# Patient Record
Sex: MALE | Race: WHITE | NOT HISPANIC OR LATINO | Employment: FULL TIME | URBAN - METROPOLITAN AREA
[De-identification: names, ages, dates, MRNs, and addresses within clinical notes are randomized per-mention and may not be internally consistent; named-entity substitution may affect disease eponyms.]

---

## 2022-05-09 ENCOUNTER — OFFICE VISIT (OUTPATIENT)
Dept: FAMILY MEDICINE CLINIC | Facility: CLINIC | Age: 22
End: 2022-05-09
Payer: COMMERCIAL

## 2022-05-09 ENCOUNTER — APPOINTMENT (OUTPATIENT)
Dept: LAB | Facility: CLINIC | Age: 22
End: 2022-05-09
Payer: COMMERCIAL

## 2022-05-09 VITALS
DIASTOLIC BLOOD PRESSURE: 82 MMHG | HEART RATE: 97 BPM | SYSTOLIC BLOOD PRESSURE: 132 MMHG | RESPIRATION RATE: 16 BRPM | WEIGHT: 177 LBS | TEMPERATURE: 97 F | BODY MASS INDEX: 26.22 KG/M2 | OXYGEN SATURATION: 97 % | HEIGHT: 69 IN

## 2022-05-09 DIAGNOSIS — Z20.828 EXPOSURE TO HERPES SIMPLEX VIRUS (HSV): ICD-10-CM

## 2022-05-09 DIAGNOSIS — Z00.00 ENCOUNTER FOR ANNUAL PHYSICAL EXAM: Primary | ICD-10-CM

## 2022-05-09 DIAGNOSIS — F17.210 CIGARETTE NICOTINE DEPENDENCE WITHOUT COMPLICATION: ICD-10-CM

## 2022-05-09 LAB
HBV SURFACE AG SER QL: NORMAL
HCV AB SER QL: NORMAL

## 2022-05-09 PROCEDURE — 36415 COLL VENOUS BLD VENIPUNCTURE: CPT

## 2022-05-09 PROCEDURE — 86592 SYPHILIS TEST NON-TREP QUAL: CPT

## 2022-05-09 PROCEDURE — 99385 PREV VISIT NEW AGE 18-39: CPT | Performed by: FAMILY MEDICINE

## 2022-05-09 PROCEDURE — 3725F SCREEN DEPRESSION PERFORMED: CPT | Performed by: FAMILY MEDICINE

## 2022-05-09 PROCEDURE — 86696 HERPES SIMPLEX TYPE 2 TEST: CPT

## 2022-05-09 PROCEDURE — 87491 CHLMYD TRACH DNA AMP PROBE: CPT

## 2022-05-09 PROCEDURE — 86803 HEPATITIS C AB TEST: CPT

## 2022-05-09 PROCEDURE — 86695 HERPES SIMPLEX TYPE 1 TEST: CPT

## 2022-05-09 PROCEDURE — 87340 HEPATITIS B SURFACE AG IA: CPT

## 2022-05-09 PROCEDURE — 87389 HIV-1 AG W/HIV-1&-2 AB AG IA: CPT

## 2022-05-09 PROCEDURE — 87591 N.GONORRHOEAE DNA AMP PROB: CPT

## 2022-05-09 NOTE — PROGRESS NOTES
BMI Counseling: Body mass index is 26 14 kg/m²  The BMI is above normal  Nutrition recommendations include decreasing portion sizes, encouraging healthy choices of fruits and vegetables, consuming healthier snacks and moderation in carbohydrate intake  Exercise recommendations include exercising 3-5 times per week  No pharmacotherapy was ordered  Rationale for BMI follow-up plan is due to patient being overweight or obese  Depression Screening and Follow-up Plan: Patient was screened for depression during today's encounter  They screened negative with a PHQ-2 score of 0  Assessment/Plan:         Problem List Items Addressed This Visit        Other    Exposure to herpes simplex virus (HSV)     Ex-girlfriend had it and pt wants edelmira checked  No lesions present  Relevant Orders    HSV 1/2 IgM and Type Specific IgG    HIV 1/2 Antigen/Antibody (4th Generation) w Reflex [facility]    RPR    Chlamydia/GC amplified DNA by PCR    Hepatitis B surface antigen    Hepatitis C antibody    Encounter for annual physical exam - Primary     CPE done  Last Tdap was in 2010  Pt declined it today  Recommend COVID vaccines  Pt had Gardasil series  Pt advised to follow a well balanced, heart healthy diet and to exercise on a regular basis  BP good  Cigarette nicotine dependence without complication     Pt smokes a few cigs per day  Discussed quitting and pt to think about it  Subjective:      Patient ID: Erin Haddad is a 20 y o  male  Pt here for new pt physical  Doing well  No cp/sob  No headaches  Pt smokes cigs and MJ  Used to vape  Pt active at work and rides bike  Drinks a few days per week  Pt also was exposed to ex-girlfriend who recently found out that she has HSV  Pt wants to be tested  Pt never had outbreak  No known history of cold sores  Last Tdap was in 2010  Had gardasil series         The following portions of the patient's history were reviewed and updated as appropriate: Past Medical History:  He has a past medical history of Allergies and Asthma ,  _______________________________________________________________________  Medical Problems:  does not have any pertinent problems on file ,  _______________________________________________________________________  Past Surgical History:   has a past surgical history that includes [place] tooth extraction  ,  _______________________________________________________________________  Family History:  family history includes Hyperlipidemia in his father and mother; Hypertension in his father and mother ,  _______________________________________________________________________  Social History:   reports that he has been smoking cigarettes  He has been smoking about 0 25 packs per day  He has never used smokeless tobacco  He reports previous alcohol use  He reports current drug use  Drug: Marijuana  ,  _______________________________________________________________________  Allergies:  has No Known Allergies     _______________________________________________________________________  No current outpatient medications on file  No current facility-administered medications for this visit      _______________________________________________________________________  Review of Systems   Constitutional: Negative for activity change, appetite change, fatigue and unexpected weight change  HENT: Negative for congestion, ear pain, rhinorrhea, sore throat and trouble swallowing  Eyes: Negative for pain, discharge and visual disturbance  Respiratory: Negative for cough, shortness of breath and wheezing  Cardiovascular: Negative for chest pain, palpitations and leg swelling  Gastrointestinal: Negative for abdominal pain, constipation, diarrhea, nausea and vomiting  Endocrine: Negative for polydipsia, polyphagia and polyuria  Genitourinary: Negative for difficulty urinating and hematuria     Musculoskeletal: Negative for arthralgias, back pain, gait problem, myalgias and neck pain  Skin: Negative for color change and rash  Neurological: Negative for dizziness, weakness and headaches  Hematological: Negative for adenopathy  Does not bruise/bleed easily  Psychiatric/Behavioral: Negative for dysphoric mood and sleep disturbance  The patient is not nervous/anxious  Objective:  Vitals:    05/09/22 1302   BP: 132/82   BP Location: Left arm   Patient Position: Sitting   Cuff Size: Standard   Pulse: 97   Resp: 16   Temp: (!) 97 °F (36 1 °C)   TempSrc: Temporal   SpO2: 97%   Weight: 80 3 kg (177 lb)   Height: 5' 9" (1 753 m)     Body mass index is 26 14 kg/m²  Physical Exam  Vitals and nursing note reviewed  Constitutional:       Appearance: Normal appearance  He is well-developed and normal weight  HENT:      Head: Normocephalic and atraumatic  Right Ear: Tympanic membrane, ear canal and external ear normal       Left Ear: Tympanic membrane, ear canal and external ear normal       Nose: Nose normal       Mouth/Throat:      Mouth: Mucous membranes are moist       Pharynx: Oropharynx is clear  No posterior oropharyngeal erythema  Eyes:      Conjunctiva/sclera: Conjunctivae normal       Pupils: Pupils are equal, round, and reactive to light  Neck:      Thyroid: No thyromegaly  Cardiovascular:      Rate and Rhythm: Normal rate and regular rhythm  Heart sounds: Normal heart sounds  No murmur heard  Pulmonary:      Effort: Pulmonary effort is normal       Breath sounds: Normal breath sounds  No wheezing or rales  Abdominal:      General: Bowel sounds are normal  There is no distension  Palpations: Abdomen is soft  There is no mass  Tenderness: There is no abdominal tenderness  Musculoskeletal:         General: No deformity  Normal range of motion  Cervical back: Normal range of motion and neck supple  Right lower leg: No edema  Left lower leg: No edema     Lymphadenopathy:      Cervical: No cervical adenopathy  Skin:     General: Skin is warm and dry  Capillary Refill: Capillary refill takes less than 2 seconds  Findings: No erythema or rash  Neurological:      General: No focal deficit present  Mental Status: He is alert and oriented to person, place, and time  Mental status is at baseline  Cranial Nerves: No cranial nerve deficit  Sensory: No sensory deficit  Motor: No abnormal muscle tone  Coordination: Coordination normal    Psychiatric:         Mood and Affect: Mood normal          Behavior: Behavior normal          Thought Content:  Thought content normal          Judgment: Judgment normal

## 2022-05-09 NOTE — ASSESSMENT & PLAN NOTE
CPE done  Last Tdap was in 2010  Pt declined it today  Recommend COVID vaccines  Pt had Gardasil series  Pt advised to follow a well balanced, heart healthy diet and to exercise on a regular basis  BP good

## 2022-05-10 LAB
C TRACH DNA SPEC QL NAA+PROBE: NEGATIVE
HSV1 IGG SER IA-ACNC: <0.91 INDEX (ref 0–0.9)
HSV2 IGG SER IA-ACNC: <0.91 INDEX (ref 0–0.9)
N GONORRHOEA DNA SPEC QL NAA+PROBE: NEGATIVE
RPR SER QL: NORMAL

## 2022-05-11 LAB — HIV 1+2 AB+HIV1 P24 AG SERPL QL IA: NORMAL

## 2022-05-17 LAB — MISCELLANEOUS LAB TEST RESULT: NORMAL

## 2022-05-19 ENCOUNTER — OFFICE VISIT (OUTPATIENT)
Dept: FAMILY MEDICINE CLINIC | Facility: CLINIC | Age: 22
End: 2022-05-19
Payer: COMMERCIAL

## 2022-05-19 VITALS
HEIGHT: 69 IN | OXYGEN SATURATION: 99 % | SYSTOLIC BLOOD PRESSURE: 122 MMHG | RESPIRATION RATE: 16 BRPM | BODY MASS INDEX: 25.92 KG/M2 | WEIGHT: 175 LBS | HEART RATE: 80 BPM | DIASTOLIC BLOOD PRESSURE: 80 MMHG | TEMPERATURE: 97.6 F

## 2022-05-19 DIAGNOSIS — J06.9 ACUTE URI: Primary | ICD-10-CM

## 2022-05-19 PROCEDURE — 3008F BODY MASS INDEX DOCD: CPT | Performed by: FAMILY MEDICINE

## 2022-05-19 PROCEDURE — 1036F TOBACCO NON-USER: CPT | Performed by: FAMILY MEDICINE

## 2022-05-19 PROCEDURE — 99213 OFFICE O/P EST LOW 20 MIN: CPT | Performed by: FAMILY MEDICINE

## 2022-05-19 RX ORDER — AMOXICILLIN 875 MG/1
875 TABLET, COATED ORAL 2 TIMES DAILY
Qty: 14 TABLET | Refills: 0 | Status: SHIPPED | OUTPATIENT
Start: 2022-05-19 | End: 2022-05-26

## 2022-05-19 NOTE — ASSESSMENT & PLAN NOTE
Will try amoxil 875 mg bid for 1 week  Pt to gargle with warm salt water a few times a day and take advil or tylenol for pain   Call if no better,

## 2022-05-19 NOTE — PROGRESS NOTES
Assessment/Plan:         Problem List Items Addressed This Visit        Respiratory    Acute URI - Primary     Will try amoxil 875 mg bid for 1 week  Pt to gargle with warm salt water a few times a day and take advil or tylenol for pain  Call if no better,            Relevant Medications    amoxicillin (AMOXIL) 875 mg tablet            Subjective:      Patient ID: Federico Clifford is a 20 y o  male  Pt has sore throat for past 1-2 weeks  No other sxs  No fever or chills  No aches  Has nasal congestion at times  No cough  No n/v/d  Pt took home COVID test and was negative  Taking zyrtec and not helping  The following portions of the patient's history were reviewed and updated as appropriate:   Past Medical History:  He has a past medical history of Allergies and Asthma ,  _______________________________________________________________________  Medical Problems:  does not have any pertinent problems on file ,  _______________________________________________________________________  Past Surgical History:   has a past surgical history that includes [place] tooth extraction  ,  _______________________________________________________________________  Family History:  family history includes Hyperlipidemia in his father and mother; Hypertension in his father and mother ,  _______________________________________________________________________  Social History:   reports that he quit smoking about 4 months ago  His smoking use included cigarettes  He has a 0 50 pack-year smoking history  He has never used smokeless tobacco  He reports current alcohol use  He reports previous drug use  Drug: Marijuana  ,  _______________________________________________________________________  Allergies:  has No Known Allergies     _______________________________________________________________________  Current Outpatient Medications   Medication Sig Dispense Refill    amoxicillin (AMOXIL) 875 mg tablet Take 1 tablet (875 mg total) by mouth in the morning and 1 tablet (875 mg total) in the evening  Do all this for 7 days  14 tablet 0     No current facility-administered medications for this visit      _______________________________________________________________________  Review of Systems   Constitutional: Negative for chills, fatigue and fever  HENT: Positive for sore throat  Negative for congestion, ear pain, postnasal drip, rhinorrhea, sinus pressure, sinus pain and trouble swallowing  Respiratory: Negative for cough, chest tightness, shortness of breath and wheezing  Cardiovascular: Negative for chest pain  Gastrointestinal: Negative for abdominal pain, diarrhea, nausea and vomiting  Musculoskeletal: Negative for arthralgias  Skin: Negative for rash  Neurological: Negative for dizziness and headaches  Objective:  Vitals:    05/19/22 0858   BP: 122/80   BP Location: Left arm   Patient Position: Sitting   Cuff Size: Standard   Pulse: 80   Resp: 16   Temp: 97 6 °F (36 4 °C)   SpO2: 99%   Weight: 79 4 kg (175 lb)   Height: 5' 9" (1 753 m)     Body mass index is 25 84 kg/m²  Physical Exam  Vitals and nursing note reviewed  Constitutional:       Appearance: He is well-developed  He is not ill-appearing or toxic-appearing  HENT:      Right Ear: Tympanic membrane and ear canal normal       Left Ear: Tympanic membrane and ear canal normal       Nose: Nose normal  No congestion or rhinorrhea  Mouth/Throat:      Mouth: Mucous membranes are moist  No oral lesions  Pharynx: Oropharynx is clear  Uvula midline  Posterior oropharyngeal erythema present  No oropharyngeal exudate or uvula swelling  Tonsils: No tonsillar exudate  Cardiovascular:      Rate and Rhythm: Normal rate and regular rhythm  Heart sounds: Normal heart sounds  No murmur heard  Pulmonary:      Effort: Pulmonary effort is normal  No respiratory distress  Breath sounds: Normal breath sounds  No wheezing or rhonchi  Musculoskeletal:      Cervical back: Normal range of motion and neck supple  Lymphadenopathy:      Cervical: Cervical adenopathy present

## 2022-05-26 ENCOUNTER — TELEPHONE (OUTPATIENT)
Dept: FAMILY MEDICINE CLINIC | Facility: CLINIC | Age: 22
End: 2022-05-26

## 2022-05-26 NOTE — TELEPHONE ENCOUNTER
Pt called stating he finished he antibiotic and he still has a sore throat  He wants to know what else can he take

## 2022-05-26 NOTE — TELEPHONE ENCOUNTER
Patient called back & I told him I'd re-route this message to one of the doctors that are in the office   (He wasn't aware Dr BELTRAN is not here)

## 2022-05-27 NOTE — TELEPHONE ENCOUNTER
Patient states hes feeling a little bit better and already took at home covid tests and all were negative- not interested in taking a PCR test right now

## 2022-06-21 ENCOUNTER — TELEPHONE (OUTPATIENT)
Dept: FAMILY MEDICINE CLINIC | Facility: CLINIC | Age: 22
End: 2022-06-21

## 2022-06-24 ENCOUNTER — OFFICE VISIT (OUTPATIENT)
Dept: FAMILY MEDICINE CLINIC | Facility: CLINIC | Age: 22
End: 2022-06-24
Payer: COMMERCIAL

## 2022-06-24 VITALS
BODY MASS INDEX: 26.36 KG/M2 | WEIGHT: 178 LBS | RESPIRATION RATE: 16 BRPM | TEMPERATURE: 97.2 F | HEIGHT: 69 IN | OXYGEN SATURATION: 99 % | HEART RATE: 66 BPM | DIASTOLIC BLOOD PRESSURE: 78 MMHG | SYSTOLIC BLOOD PRESSURE: 122 MMHG

## 2022-06-24 DIAGNOSIS — R07.0 THROAT PAIN: Primary | ICD-10-CM

## 2022-06-24 PROBLEM — J06.9 ACUTE URI: Status: RESOLVED | Noted: 2022-05-19 | Resolved: 2022-06-24

## 2022-06-24 PROCEDURE — 99213 OFFICE O/P EST LOW 20 MIN: CPT | Performed by: FAMILY MEDICINE

## 2022-06-24 PROCEDURE — 3008F BODY MASS INDEX DOCD: CPT | Performed by: FAMILY MEDICINE

## 2022-06-24 RX ORDER — CETIRIZINE HYDROCHLORIDE 10 MG/1
10 TABLET ORAL DAILY
COMMUNITY

## 2022-06-24 RX ORDER — FLUTICASONE PROPIONATE 50 MCG
1 SPRAY, SUSPENSION (ML) NASAL DAILY
COMMUNITY

## 2022-06-24 NOTE — ASSESSMENT & PLAN NOTE
Pt has had it for 2 months and no better  Was on abxs for 1 week and helped a little  No other sxs except for PND and congestion which are chronic  Exam normal today  Will refer to ENT for laryngoscopy and mgmt  Pt is smoker

## 2022-06-24 NOTE — PROGRESS NOTES
Assessment/Plan:         Problem List Items Addressed This Visit        Other    Throat pain - Primary     Pt has had it for 2 months and no better  Was on abxs for 1 week and helped a little  No other sxs except for PND and congestion which are chronic  Exam normal today  Will refer to ENT for laryngoscopy and mgmt  Pt is smoker  Relevant Orders    Ambulatory Referral to Otolaryngology            Subjective:      Patient ID: Ana Vaca is a 20 y o  male  Pt here for f/u sore throat  Pt has had it for past 2 months on left side  Was on amoxil for 7 days in May 2022 and helped some but pain never went away  No fever or chills or aches  Has chronic nasal congestion and PND  Takes zyrtec and flonase daily  No cough  No swollen glands  Hurts to swallow  Pt smokes 5 cigs per day but used to smoke more  The following portions of the patient's history were reviewed and updated as appropriate:   Past Medical History:  He has a past medical history of Allergies and Asthma ,  _______________________________________________________________________  Medical Problems:  does not have any pertinent problems on file ,  _______________________________________________________________________  Past Surgical History:   has a past surgical history that includes [place] tooth extraction  ,  _______________________________________________________________________  Family History:  family history includes Hyperlipidemia in his father and mother; Hypertension in his father and mother ,  _______________________________________________________________________  Social History:   reports that he has been smoking cigarettes  He has a 0 50 pack-year smoking history  He has never used smokeless tobacco  He reports current alcohol use  He reports previous drug use  Drug: Marijuana  ,  _______________________________________________________________________  Allergies:  has No Known Allergies     _______________________________________________________________________  Current Outpatient Medications   Medication Sig Dispense Refill    cetirizine (ZyrTEC) 10 mg tablet Take 10 mg by mouth daily      fluticasone (FLONASE) 50 mcg/act nasal spray 1 spray into each nostril daily       No current facility-administered medications for this visit      _______________________________________________________________________  Review of Systems   Constitutional: Negative for chills, fatigue and fever  HENT: Positive for congestion, postnasal drip and sore throat  Negative for ear pain, rhinorrhea, sinus pressure, sinus pain, tinnitus and trouble swallowing  Respiratory: Negative for cough, chest tightness, shortness of breath and wheezing  Cardiovascular: Negative for chest pain  Gastrointestinal: Negative for abdominal pain, diarrhea, nausea and vomiting  Musculoskeletal: Negative for arthralgias  Skin: Negative for rash  Neurological: Negative for dizziness and headaches  Objective:  Vitals:    06/24/22 1130   BP: 122/78   BP Location: Left arm   Patient Position: Sitting   Cuff Size: Standard   Pulse: 66   Resp: 16   Temp: (!) 97 2 °F (36 2 °C)   TempSrc: Temporal   SpO2: 99%   Weight: 80 7 kg (178 lb)   Height: 5' 9" (1 753 m)     Body mass index is 26 29 kg/m²  Physical Exam  Vitals and nursing note reviewed  Constitutional:       Appearance: He is well-developed  He is not ill-appearing or toxic-appearing  HENT:      Right Ear: Tympanic membrane and ear canal normal       Left Ear: Tympanic membrane and ear canal normal       Nose: Nose normal  No congestion or rhinorrhea  Mouth/Throat:      Mouth: Mucous membranes are moist  No oral lesions  Pharynx: Oropharynx is clear  Uvula midline  No oropharyngeal exudate, posterior oropharyngeal erythema or uvula swelling  Tonsils: No tonsillar exudate     Cardiovascular:      Rate and Rhythm: Normal rate and regular rhythm  Heart sounds: Normal heart sounds  No murmur heard  Pulmonary:      Effort: Pulmonary effort is normal  No respiratory distress  Breath sounds: Normal breath sounds  No wheezing or rhonchi  Musculoskeletal:      Cervical back: Normal range of motion and neck supple  Lymphadenopathy:      Cervical: No cervical adenopathy

## 2022-06-28 ENCOUNTER — TELEPHONE (OUTPATIENT)
Dept: FAMILY MEDICINE CLINIC | Facility: CLINIC | Age: 22
End: 2022-06-28

## 2022-06-28 NOTE — TELEPHONE ENCOUNTER
Insurance isn't covering the STD lab work with dx used  Could we use Z20 2? Let me know when new script is in so I can have it resubmitted

## 2022-06-29 DIAGNOSIS — Z20.2 VENEREAL DISEASE CONTACT: Primary | ICD-10-CM

## 2024-01-10 ENCOUNTER — TELEPHONE (OUTPATIENT)
Dept: NEUROLOGY | Facility: CLINIC | Age: 24
End: 2024-01-10

## 2024-01-10 NOTE — TELEPHONE ENCOUNTER
Pt left v/m at [place] office yesterday looking to sched an appt with Dr. Jordan who is his mother's provider.

## 2024-01-25 ENCOUNTER — OFFICE VISIT (OUTPATIENT)
Dept: NEUROLOGY | Facility: CLINIC | Age: 24
End: 2024-01-25
Payer: COMMERCIAL

## 2024-01-25 VITALS
HEART RATE: 70 BPM | DIASTOLIC BLOOD PRESSURE: 80 MMHG | SYSTOLIC BLOOD PRESSURE: 120 MMHG | BODY MASS INDEX: 24.29 KG/M2 | HEIGHT: 69 IN | WEIGHT: 164 LBS

## 2024-01-25 DIAGNOSIS — G25.0 TREMOR, ESSENTIAL: Primary | ICD-10-CM

## 2024-01-25 DIAGNOSIS — R41.840 DECREASED ATTENTION SPAN: ICD-10-CM

## 2024-01-25 PROCEDURE — 99204 OFFICE O/P NEW MOD 45 MIN: CPT | Performed by: STUDENT IN AN ORGANIZED HEALTH CARE EDUCATION/TRAINING PROGRAM

## 2024-01-25 RX ORDER — PROPRANOLOL HYDROCHLORIDE 20 MG/1
20 TABLET ORAL EVERY 8 HOURS SCHEDULED
Qty: 90 TABLET | Refills: 3 | Status: SHIPPED | OUTPATIENT
Start: 2024-01-25

## 2024-01-25 NOTE — PROGRESS NOTES
[facility]     CONSULT NOTE       Name:  Romeo Torres  MRN: [id number]  : 2000  Date: 23    ASSESSMENT & PLAN     Romeo Torres is a very pleasant  23 y.o. male  referred here for evaluation of tremor.    Initial evaluation 2024    Decreased attention Span  Decreased attention   Since child, had poor attention span and poor memory and previous concerns for possible ADHD but never got evaluated    - referral to psychiatry in regards for ADHD     Tremor, essential  24yo w/ w tobacco use history (1/4 pk day for 3 years, and was on/off vaping/tobacco prior since 17yo ) and marijuana (1-2x/night for 1/2 gram to 1 g since 16yo) coming in for evaluation of a tremor.  He has never seen anyone tremors. He has noticed them since after high school when he noticed that he had shaking. He stated about 3-4 years ago that when he was 19-20 yo. The first time he remember saying something to him when his boss stated that he shook. He had tried his mom's propanolol for her ET when noticed more stressed and tremor worsening in bilateral UE but had noted when tried it during work day felt a bit sedated.     Fam hx: mother has ET   Frequency: daily   Exacerbating factors: nervousness and anxiety   Alleviating factors: has tried propanolol prn and that seemed to help     Exam: Noted kinetic 2-3 hx low frq tremo in bilateral UE .        Impression: essential tremor     Plan   [facility]  - will start on propanolol 20mg TID and see if can tolerate.   - recommending establishing w/ PCP in regards to anxiety  - continue w/ cutting down on smoking   - f/u in 3-4 months          Problem List Items Addressed This Visit       Decreased attention Span     Decreased attention   Since child, had poor attention span and poor memory and previous concerns for possible ADHD but never got evaluated    - referral to psychiatry in regards for ADHD          Relevant Orders     Ambulatory referral to Psych Services    Tremor, essential - Primary     24yo w/ w tobacco use history (1/4 pk day for 3 years, and was on/off vaping/tobacco prior since 15yo ) and marijuana (1-2x/night for 1/2 gram to 1 g since 14yo) coming in for evaluation of a tremor.  He has never seen anyone tremors. He has noticed them since after high school when he noticed that he had shaking. He stated about 3-4 years ago that when he was [age] yo. The first time he remember saying something to him when his boss stated that he shook. He had tried his mom's propanolol for her ET when noticed more stressed and tremor worsening in bilateral UE but had noted when tried it during work day felt a bit sedated.     Fam hx: mother has ET   Frequency: daily   Exacerbating factors: nervousness and anxiety   Alleviating factors: has tried propanolol prn and that seemed to help     Exam: Noted kinetic 2-3 hx low frq tremo in bilateral UE .        Impression: essential tremor     Plan   [facility] w/ [facility]  - will start on propanolol 20mg TID and see if can tolerate.   - recommending establishing w/ PCP in regards to anxiety  - continue w/ cutting down on smoking   - f/u in 3-4 months          Relevant Medications    propranolol (INDERAL) 20 mg tablet                          CHIEF COMPLAINT     We had the pleasure of evaluating Romeo Torres in neurological consultation today. Romeo Torres is a   right handed male who presents today for evaluation of tremor.     History obtained from patient as well as available medical record review.    HISTORY OF PRESENT ILLNESS     Subjective:     HPI    24yo w/ w tobacco use history (1/4 pk day for 3 years, and was on/off vaping/tobacco prior since 15yo ) and marijuana (1-2x/night for 1/2 gram to 1 g since 14yo) coming in for evaluation of a tremor.     He has never seen anyone tremors. He has noticed them since after high school when he noticed that he had  shaking.   He stated about 3-4 years ago that when he was [age] yo. The first time he remember saying something to him when his boss stated that he shook.     He stated that when he got excited in the past, he noticed that his hands were shaking and was high. He was a bit nervous during this encounter.     In regards to hi shaking, he noticed that the shaking is moreso the arms and sometimes the chest. When he was upset in the upset in the past when he got nervous, he noticed that  he could tremors in his whole body when he was very nervous. He would sometimes have some chest tightness.     He stated that he was having a very anxious time during new years and felt that he very nervous at that time. He stated he was upset and freaking out and he tried his mom's propanolol and felt much better.  He typically tries to refrain from medications. He feels     Frequency: daily   Exacerbating factors: nervousness and anxiety   Alleviating factors: has tried propanolol prn and that seemed to help     Avoid drinking ETOH and is trying to wean down on the marijuana.     He is open to taking propanolol but noticed that things were a little slow after he took it.           Previous workup:    Labs:  - no recent lab work     Imaging:  - none    Previous medication trials:  - none    Current medications:   - none     Sleep   - averages: 6 hours   Problems falling asleep?:   No  Problems staying asleep?:  No  Snoring: unsure     Physical activity:   Usually active most of the day for work   Starting to go to the gym     Water: 6 bottle of water per day  Caffeine: none  per day    Mood:   Denies history of anxiety or depression or other diagnosed mood disorder    The following portions of the patient's history were reviewed and updated as appropriate: allergies, current medications, past family history, past medical history, past social history, past surgical history and problem list.    Pertinent family history:  Any family history  "of tremor - Yes, mother has ET     Pertinent social history:  Illicit Drugs: admits to [facility]   - has 3-4x on mushrooms < 2 g   Alcohol/tobacco: Denies alcohol use, Denies caffeine use, uses 1/4 pk for hx     PAST MEDICAL HISTORY     Past Medical History:   Diagnosis Date    Allergies     Asthma        Patient Active Problem List   Diagnosis    Encounter for annual physical exam    Exposure to herpes simplex virus (HSV)    Cigarette nicotine dependence without complication    Throat pain    Decreased attention Span    Tremor, essential       MEDICATIONS        Current Outpatient Medications   Medication Sig Dispense Refill    propranolol (INDERAL) 20 mg tablet Take 1 tablet (20 mg total) by mouth every 8 (eight) hours 90 tablet 3    cetirizine (ZyrTEC) 10 mg tablet Take 10 mg by mouth daily      fluticasone (FLONASE) 50 mcg/act nasal spray 1 spray into each nostril daily      omeprazole (PriLOSEC) 20 mg delayed release capsule Take 1 capsule (20 mg total) by mouth daily 30 capsule 11     No current facility-administered medications for this visit.        ALLERGIES      No Known Allergies    OBJECTIVE     Patient ID: Romeo Torres is a 23 y.o. male    Blood pressure 120/80, pulse 70, height 5' 9\" (1.753 m), weight 74.4 kg (164 lb).    GENERAL EXAM:    CONSTITUTIONAL: Well developed, well nourished, well groomed. No dysmorphic features.     Eyes:  PERRLA, EOM normal      Neck:  Normal ROM, neck supple.      HEENT:  Normocephalic atraumatic. No meningismus.    Oropharynx is clear and moist. No oral mucosal lesions.   Chest:  Respirations regular and unlabored.    Cardiovascular:  Distal extremities warm without palpable edema or tenderness, no observed significant swelling.    Musculoskeletal:  Full range of motion.    Skin:  warm and dry   Psychiatric:  Normal behavior and appropriate affect      NEUROLOGICAL EXAM:    Neurological Exam  Mental Status  Awake, alert and oriented to person, place and " time.    Cranial Nerves  CN II: Visual acuity is normal. Visual fields full to confrontation.  CN III, IV, VI: Extraocular movements intact bilaterally. Normal lids and orbits bilaterally. Pupils equal round and reactive to light bilaterally.  CN V: Facial sensation is normal.  CN VII: Full and symmetric facial movement.  CN VIII: Hearing is normal.  CN IX, X: Palate elevates symmetrically. Normal gag reflex.  CN XI: Shoulder shrug strength is normal.  CN XII: Tongue midline without atrophy or fasciculations.  Fundus normal bilaterally.    Motor   Strength is 5/5 throughout all four extremities.    Sensory  Sensation is intact to light touch, pinprick, vibration and proprioception in all four extremities.    Reflexes                                            Right                      Left  Brachioradialis                    2+                         2+  Biceps                                 2+                         2+  Triceps                                2+                         2+  Patellar                                2+                         2+  Achilles                                2+                         2+  Right Plantar: downgoing  Left Plantar: downgoing    Right pathological reflexes: Ankle clonus absent.  Left pathological reflexes: Ankle clonus absent.    Coordination  Right: Finger-to-nose abnormality: Rapid alternating movement normal. Heel-to-shin normal.Left: Finger-to-nose abnormality: Rapid alternating movement normal. Heel-to-shin normal.      Noted kinetic 2-3 hx low frq tremo in bilateral UE .    Gait  Casual gait is normal including stance, stride, and arm swing.          ROS     Review of Systems   Constitutional:  Negative for chills and fever.   HENT:  Negative for ear pain and sore throat.    Eyes:  Negative for pain and visual disturbance.   Respiratory:  Negative for cough and shortness of breath.    Cardiovascular:  Negative for chest pain and palpitations.    Gastrointestinal:  Negative for abdominal pain and vomiting.   Genitourinary:  Negative for dysuria and hematuria.   Musculoskeletal:  Negative for arthralgias and back pain.   Skin:  Negative for color change and rash.   Neurological:  Positive for tremors. Negative for seizures and syncope.   All other systems reviewed and are negative.        Review of systems as documented by the MA was reviewed in full by myself, Guy Jordan DO

## 2024-01-25 NOTE — ASSESSMENT & PLAN NOTE
Decreased attention   Since child, had poor attention span and poor memory and previous concerns for possible ADHD but never got evaluated    - referral to psychiatry in regards for ADHD

## 2024-01-25 NOTE — ASSESSMENT & PLAN NOTE
24yo w/ w tobacco use history (1/4 pk day for 3 years, and was on/off vaping/tobacco prior since 17yo ) and marijuana (1-2x/night for 1/2 gram to 1 g since 16yo) coming in for evaluation of a tremor.  He has never seen anyone tremors. He has noticed them since after high school when he noticed that he had shaking. He stated about 3-4 years ago that when he was [age] yo. The first time he remember saying something to him when his boss stated that he shook. He had tried his mom's propanolol for her ET when noticed more stressed and tremor worsening in bilateral UE but had noted when tried it during work day felt a bit sedated.     Fam hx: mother has ET   Frequency: daily   Exacerbating factors: nervousness and anxiety   Alleviating factors: has tried propanolol prn and that seemed to help     Exam: Noted kinetic 2-3 hx low frq tremo in bilateral UE .        Impression: essential tremor     Plan   [facility]  - will start on propanolol 20mg TID and see if can tolerate.   - recommending establishing w/ PCP in regards to anxiety  - continue w/ cutting down on smoking   - f/u in 3-4 months

## 2024-01-25 NOTE — PATIENT INSTRUCTIONS
- will start on propanolol 20mg TID and see if can tolerate.   - recommending establishing w/ PCP in regards to anxiety  - continue w/ cutting down on smoking   - referral to psychiatry in regards for ADHD   - f/u in 3-4 months

## 2024-02-27 ENCOUNTER — TELEPHONE (OUTPATIENT)
Dept: PSYCHIATRY | Facility: CLINIC | Age: 24
End: 2024-02-27

## 2024-05-03 ENCOUNTER — TELEPHONE (OUTPATIENT)
Dept: NEUROLOGY | Facility: CLINIC | Age: 24
End: 2024-05-03

## 2024-05-07 ENCOUNTER — OFFICE VISIT (OUTPATIENT)
Dept: NEUROLOGY | Facility: CLINIC | Age: 24
End: 2024-05-07
Payer: COMMERCIAL

## 2024-05-07 VITALS
HEART RATE: 78 BPM | DIASTOLIC BLOOD PRESSURE: 82 MMHG | WEIGHT: 164 LBS | BODY MASS INDEX: 24.22 KG/M2 | SYSTOLIC BLOOD PRESSURE: 126 MMHG

## 2024-05-07 DIAGNOSIS — F41.9 ANXIETY DISORDER: ICD-10-CM

## 2024-05-07 DIAGNOSIS — G25.0 TREMOR, ESSENTIAL: Primary | ICD-10-CM

## 2024-05-07 DIAGNOSIS — R41.840 DECREASED ATTENTION SPAN: ICD-10-CM

## 2024-05-07 PROCEDURE — 99214 OFFICE O/P EST MOD 30 MIN: CPT | Performed by: STUDENT IN AN ORGANIZED HEALTH CARE EDUCATION/TRAINING PROGRAM

## 2024-05-07 RX ORDER — PROPRANOLOL HYDROCHLORIDE 20 MG/1
20 TABLET ORAL EVERY 12 HOURS SCHEDULED
Qty: 90 TABLET | Refills: 3 | Status: SHIPPED | OUTPATIENT
Start: 2024-05-07

## 2024-05-07 NOTE — PROGRESS NOTES
Patient ID: Romeo Torres is a 24 y.o. male.    Assessment/Plan:       Problem List Items Addressed This Visit       Decreased attention Span    Tremor, essential - Primary    Relevant Medications    propranolol (INDERAL) 20 mg tablet     Other Visit Diagnoses       Anxiety disorder                       Tremor, essential  23yo w/ w tobacco use history (1/4 pk day for 3 years, and was on/off vaping/tobacco prior since 17yo ) and marijuana (1-2x/night for 1/2 gram to 1 g since 14yo) coming in for f/u of a tremor.  At last office visit 01/25/2024, he noted  he had never seen anyone tremors and noticed them since after high school when he noticed that he had shaking when he was 18 years old. The first time he remember saying something to him when his boss stated that he shook. He had tried his mom's propanolol for her ET when noticed more stressed and tremor worsening in bilateral UE but had noted when tried it during work day felt a bit sedated.  I recommended at that time to start on propranolol 20 mg 3 times daily and see if he can tolerate the medication as well as following up with his PCP for noted anxiety.    Since then, he has been taking the propanolol more as needed and that seem to be helping and noticing much more improvement.      Fam hx: mother has ET   Frequency: occasionally a few times a month  Exacerbating factors: nervousness and anxiety   Alleviating factors: has tried propanolol prn and that seemed to help     Impression: essential tremor      Plan   [facility]  - continue on propanolol 20mg prn    - if worsens and becomes more regular, can do more daily   - recommending establishing w/ PCP in regards to anxiety  - f/u with psych for suspected ADHD dx   - continue w/ cutting down on smoking   - f/u in 6 months       Subjective:    HPI    23yo w/ w tobacco use history (1/4 pk day for 3 years, and was on/off vaping/tobacco prior since 17yo ) and marijuana  "(1-2x/night for 1/2 gram to 1 g since 16yo) coming in for f/u of a tremor.  At last office visit 01/25/2024, he noted  he had never seen anyone tremors and noticed them since after high school when he noticed that he had shaking when he was 18 years old. The first time he remember saying something to him when his boss stated that he shook. He had tried his mom's propanolol for her ET when noticed more stressed and tremor worsening in bilateral UE but had noted when tried it during work day felt a bit sedated.  I recommended at that time to start on propranolol 20 mg 3 times daily and see if he can tolerate the medication as well as following up with his PCP for noted anxiety.    Interval hx:   He takes the propanolol prn at this time particularly when he feels he's more anxious.   He doesn't taken the propanolol daily or only takes it once a day.   He stated he took a propanolol everyday and tries to take in the morning but forget the other doses.   He does play video games and has moments where he gets stressful and that would help get it.    He feels like the medication is helping. He has not noticed it nearly as much and others have not noticed as much either. The only time he has tremors when he is stressed. His mom gave him pillbox.   No side effects on inderal.         Historical info from 01/2024 initial consult:  \"Previous workup:     Labs:  - no recent lab work      Imaging:  - none     Previous medication trials:  - none     Current medications:   - none      Sleep   - averages: 6 hours   Problems falling asleep?:   No  Problems staying asleep?:  No  Snoring: unsure      Physical activity:   Usually active most of the day for work   Starting to go to the gym      Water: 6 bottle of water per day  Caffeine: none  per day     Mood:   Denies history of anxiety or depression or other diagnosed mood disorder     The following portions of the patient's history were reviewed and updated as appropriate: allergies, " "current medications, past family history, past medical history, past social history, past surgical history and problem list.     Pertinent family history:  Any family history of tremor - Yes, mother has ET      Pertinent social history:  [facility]                 - has 3-4x on mushrooms < 2 g   Alcohol/tobacco: Denies alcohol use, Denies caffeine use, uses 1/4 pk for hx \"    The following portions of the patient's history were reviewed and updated as appropriate: allergies, current medications, past family history, past medical history, past social history, past surgical history, and problem list.         Objective:    There were no vitals taken for this visit.    Physical Exam  Eyes:      General: Lids are normal.      Extraocular Movements: Extraocular movements intact.      Pupils: Pupils are equal, round, and reactive to light.   Neurological:      Motor: Motor strength is normal.     Deep Tendon Reflexes:      Reflex Scores:       Tricep reflexes are 2+ on the right side and 2+ on the left side.       Bicep reflexes are 2+ on the right side and 2+ on the left side.       Brachioradialis reflexes are 2+ on the right side and 2+ on the left side.       Patellar reflexes are 2+ on the right side and 2+ on the left side.       Achilles reflexes are 2+ on the right side and 2+ on the left side.      GENERAL EXAM:    CONSTITUTIONAL: Well developed, well nourished, well groomed. No dysmorphic features.     Eyes:  PERRLA, EOM normal      Neck:  Normal ROM, neck supple.      HEENT:  Normocephalic atraumatic. No meningismus.    Oropharynx is clear and moist. No oral mucosal lesions.   Chest:  Respirations regular and unlabored.    Cardiovascular:  Distal extremities warm without palpable edema or tenderness, no observed significant swelling.    Musculoskeletal:  Full range of motion.    Skin:  warm and dry   Psychiatric:  Normal behavior and appropriate affect          Neurological Exam  Mental " Status  Awake, alert and oriented to person, place and time.    Cranial Nerves  CN II: Visual acuity is normal. Visual fields full to confrontation.  CN III, IV, VI: Extraocular movements intact bilaterally. Normal lids and orbits bilaterally. Pupils equal round and reactive to light bilaterally.  CN V: Facial sensation is normal.  CN VII: Full and symmetric facial movement.  CN VIII: Hearing is normal.  CN IX, X: Palate elevates symmetrically. Normal gag reflex.  CN XI: Shoulder shrug strength is normal.  CN XII: Tongue midline without atrophy or fasciculations.    Motor   Strength is 5/5 throughout all four extremities.    Sensory  Sensation is intact to light touch, pinprick, vibration and proprioception in all four extremities.    Reflexes                                            Right                      Left  Brachioradialis                    2+                         2+  Biceps                                 2+                         2+  Triceps                                2+                         2+  Patellar                                2+                         2+  Achilles                                2+                         2+  Right Plantar: downgoing  Left Plantar: downgoing    Right pathological reflexes: Ankle clonus absent.  Left pathological reflexes: Ankle clonus absent.    Coordination  Right: Finger-to-nose abnormality: Rapid alternating movement normal. Heel-to-shin normal.Left: Finger-to-nose abnormality: Rapid alternating movement normal. Heel-to-shin normal.      Mild kinetic tremor low freq in bilat UE at baseline.    Gait  Casual gait is normal including stance, stride, and arm swing.        ROS:    Review of Systems   Constitutional:  Negative for chills and fever.   HENT:  Negative for ear pain and sore throat.    Eyes:  Negative for pain and visual disturbance.   Respiratory:  Negative for cough and shortness of breath.    Cardiovascular:  Negative for chest pain and  palpitations.   Gastrointestinal:  Negative for abdominal pain and vomiting.   Genitourinary:  Negative for dysuria and hematuria.   Musculoskeletal:  Negative for arthralgias and back pain.   Skin:  Negative for color change and rash.   Neurological:  Positive for tremors. Negative for seizures and syncope.   All other systems reviewed and are negative.

## 2024-05-07 NOTE — PATIENT INSTRUCTIONS
- continue on propanolol 20mg prn    - if worsens and becomes more regular, can do more daily   - recommending establishing w/ PCP in regards to anxiety  - f/u with psych for suspected ADHD dx   - continue w/ cutting down on smoking   - f/u in 6 months

## 2024-07-19 ENCOUNTER — OFFICE VISIT (OUTPATIENT)
Dept: FAMILY MEDICINE CLINIC | Facility: CLINIC | Age: 24
End: 2024-07-19
Payer: COMMERCIAL

## 2024-07-19 ENCOUNTER — NURSE TRIAGE (OUTPATIENT)
Age: 24
End: 2024-07-19

## 2024-07-19 VITALS
RESPIRATION RATE: 16 BRPM | TEMPERATURE: 98.3 F | SYSTOLIC BLOOD PRESSURE: 120 MMHG | HEIGHT: 69 IN | HEART RATE: 94 BPM | BODY MASS INDEX: 26.66 KG/M2 | DIASTOLIC BLOOD PRESSURE: 84 MMHG | WEIGHT: 180 LBS | OXYGEN SATURATION: 99 %

## 2024-07-19 DIAGNOSIS — K52.9 GASTROENTERITIS: Primary | ICD-10-CM

## 2024-07-19 PROCEDURE — 99213 OFFICE O/P EST LOW 20 MIN: CPT | Performed by: FAMILY MEDICINE

## 2024-07-19 NOTE — TELEPHONE ENCOUNTER
Regarding: Diarrhea  ----- Message from Alcira MONTANEZ sent at 7/19/2024  7:59 AM EDT -----  Patient called requesting to schedule an appointment for today.  Patient stated he has been having diarrhea since yesterday morning.  Patient stated he felt a little warm and only had pain in his stomach.  Scheduled patient for 9:45 am with PCP.  Please call patient to also triage - [phone].  Thank you!

## 2024-07-19 NOTE — TELEPHONE ENCOUNTER
"Pt has been having diarrhea since yesterday. Pt states that he has gone around ~12 times since yesterday morning. Watery stool with mild abdominal pain. Pt states that abdominal pain is a constant symptom for him from GERD. Denies fever, n/v. Pt has been drinking water. Pt have office appt this morning.     Reason for Disposition   SEVERE diarrhea (e.g., 7 or more times / day more than normal) and present > 24 hours (1 day)    Answer Assessment - Initial Assessment Questions  1. DIARRHEA SEVERITY: \"How bad is the diarrhea?\" \"How many extra stools have you had in the past 24 hours than normal?\"     - NO DIARRHEA (SCALE 0)    - MILD (SCALE 1-3): Few loose or mushy BMs; increase of 1-3 stools over normal daily number of stools; mild increase in ostomy output.    -  MODERATE (SCALE 4-7): Increase of 4-6 stools daily over normal; moderate increase in ostomy output.  * SEVERE (SCALE 8-10; OR 'WORST POSSIBLE'): Increase of 7 or more stools daily over normal; moderate increase in ostomy output; incontinence.      ~12 times since yesterday  2. ONSET: \"When did the diarrhea begin?\"       Two days ago  3. BM CONSISTENCY: \"How loose or watery is the diarrhea?\"       Watery  4. VOMITING: \"Are you also vomiting?\" If Yes, ask: \"How many times in the past 24 hours?\"       Denies  5. ABDOMINAL PAIN: \"Are you having any abdominal pain?\" If Yes, ask: \"What does it feel like?\" (e.g., crampy, dull, intermittent, constant)       Crampy-dull  6. ABDOMINAL PAIN SEVERITY: If present, ask: \"How bad is the pain?\"  (e.g., Scale 1-10; mild, moderate, or severe)    - MILD (1-3): doesn't interfere with normal activities, abdomen soft and not tender to touch     - MODERATE (4-7): interferes with normal activities or awakens from sleep, tender to touch     - SEVERE (8-10): excruciating pain, doubled over, unable to do any normal activities        Mild-moderate   7. ORAL INTAKE: If vomiting, \"Have you been able to drink liquids?\" \"How much fluids " "have you had in the past 24 hours?\"      Drinking water  8. HYDRATION: \"Any signs of dehydration?\" (e.g., dry mouth [not just dry lips], too weak to stand, dizziness, new weight loss) \"When did you last urinate?\"      Denies  9. EXPOSURE: \"Have you traveled to a foreign country recently?\" \"Have you been exposed to anyone with diarrhea?\" \"Could you have eaten any food that was spoiled?\"      Denies  10. ANTIBIOTIC USE: \"Are you taking antibiotics now or have you taken antibiotics in the past 2 months?\"        Denies  11. OTHER SYMPTOMS: \"Do you have any other symptoms?\" (e.g., fever, blood in stool)        GERD on Wednesday  12. PREGNANCY: \"Is there any chance you are pregnant?\" \"When was your last menstrual period?\"        NA    Protocols used: Diarrhea-ADULT-[place]    "

## 2024-07-19 NOTE — PROGRESS NOTES
Ambulatory Visit  Name: Romeo Torres      : 2000      MRN: [id number]  Encounter Provider: Triston Ball MD  Encounter Date: 2024   Encounter department: [facility] MEDICINE    Assessment & Plan   Patient has Gastroenteritis for past 2 days. Likely viral. Patient to increase rest and fluids. Patient to follow BRAT diet and take imodium as needed. Patient to call if worsens.     Depression Screening and Follow-up Plan: Patient was screened for depression during today's encounter. They screened negative with a PHQ-2 score of 0.        History of Present Illness     Patient here for diarrhea since yesterday. Patient has had watery stools and no blood or mucus. Patient has had multiple episodes. Patient has gone 7 times today so far. No fever or chills. Has mild cramping. Has mild nausea. No cough or congestion. No headache or sore throat. No aches or fatigue. Hasn't taken any over the counter medications. No sick contacts.       Review of Systems   Constitutional:  Negative for chills, fatigue and fever.   HENT:  Negative for congestion, ear pain, postnasal drip, rhinorrhea, sinus pressure, sinus pain, sore throat and trouble swallowing.    Respiratory:  Negative for cough, chest tightness, shortness of breath and wheezing.    Cardiovascular:  Negative for chest pain.   Gastrointestinal:  Positive for diarrhea and nausea. Negative for abdominal pain and vomiting.   Musculoskeletal:  Negative for arthralgias.   Skin:  Negative for rash.   Neurological:  Negative for dizziness and headaches.     Past Medical History:   Diagnosis Date    Allergies     Asthma      Past Surgical History:   Procedure Laterality Date    WISDOM TOOTH EXTRACTION       Family History   Problem Relation Age of Onset    Hypertension Mother     Hyperlipidemia Mother     Hypertension Father     Hyperlipidemia Father      Social History     Tobacco Use    Smoking status: Every Day     Current packs/day: 0.00      "Average packs/day: 0.3 packs/day for 2.0 years (0.5 ttl pk-yrs)     Types: Cigarettes     Start date:      Last attempt to quit:      Years since quittin.5    Smokeless tobacco: Never   Vaping Use    Vaping status: Never Used   Substance and Sexual Activity    Alcohol use: Yes    Drug use: Not Currently     Types: Marijuana    Sexual activity: Yes     Partners: Female     Current Outpatient Medications on File Prior to Visit   Medication Sig    propranolol (INDERAL) 20 mg tablet Take 1 tablet (20 mg total) by mouth every 12 (twelve) hours    cetirizine (ZyrTEC) 10 mg tablet Take 10 mg by mouth daily    fluticasone (FLONASE) 50 mcg/act nasal spray 1 spray into each nostril daily    omeprazole (PriLOSEC) 20 mg delayed release capsule Take 1 capsule (20 mg total) by mouth daily     No Known Allergies  Immunization History   Administered Date(s) Administered    DTaP 2000, 1999, 1999, 2000, 2003    HPV Quadrivalent 2012, 2013, 2013    Hep B, Adolescent or Pediatric 1999, 1999, 1999    HiB 1999, 1999, 1999, 2000    INFLUENZA 10/10/2003, 2003, 2004, 2005, 2006, 10/25/2007, 10/17/2008, 10/17/2009, 2010, 10/06/2011, 2012, 10/30/2013, 10/23/2014, 10/28/2015, 10/19/2016, 10/24/2017, 2018    IPV 1999, 1999, 2000, 2003    MMR 02/10/2000, 2003    Meningococcal B, Recombinant (TRUMENBA) 2017, 2017    Meningococcal MCV4P 2010, 2017    Pneumococcal Conjugate PCV 7 2000, 10/10/2000    Tdap 2010    Varicella 02/10/2000, 2007     Objective     /84 (BP Location: Left arm, Patient Position: Sitting, Cuff Size: Standard)   Pulse 94   Temp 98.3 °F (36.8 °C) (Tympanic)   Resp 16   Ht 5' 9\" (1.753 m)   Wt 81.6 kg (180 lb)   SpO2 99%   BMI 26.58 kg/m²     Physical Exam  Vitals and nursing note reviewed. "   Constitutional:       Appearance: Normal appearance. He is normal weight.   Neck:      Vascular: No carotid bruit.   Cardiovascular:      Rate and Rhythm: Normal rate and regular rhythm.      Heart sounds: Normal heart sounds. No murmur heard.  Pulmonary:      Effort: Pulmonary effort is normal.      Breath sounds: Normal breath sounds. No wheezing.   Abdominal:      General: Abdomen is flat.      Palpations: Abdomen is soft.      Tenderness: There is no abdominal tenderness.   Musculoskeletal:      Cervical back: Normal range of motion and neck supple. No muscular tenderness.      Right lower leg: No edema.      Left lower leg: No edema.   Lymphadenopathy:      Cervical: No cervical adenopathy.   Neurological:      Mental Status: He is alert.   Psychiatric:         Mood and Affect: Mood normal.         Behavior: Behavior normal.         Thought Content: Thought content normal.         Judgment: Judgment normal.

## 2024-07-19 NOTE — ASSESSMENT & PLAN NOTE
Patient has had watery diarrhea since yesterday. No fever or chills. No abdominal pain or blood in stools. Likely viral. Patient to increase fluids and discussed BRAT diet. Patient can take Pepto or imodium as needed. Call if worsens. Note given for work.

## 2024-08-18 PROBLEM — K52.9 GASTROENTERITIS: Status: RESOLVED | Noted: 2024-07-19 | Resolved: 2024-08-18

## 2024-09-27 ENCOUNTER — TELEPHONE (OUTPATIENT)
Age: 24
End: 2024-09-27

## 2024-11-19 ENCOUNTER — TELEPHONE (OUTPATIENT)
Age: 24
End: 2024-11-19

## 2024-11-19 NOTE — TELEPHONE ENCOUNTER
"Behavioral Health Outpatient Intake Questions    Referred By   : PCP     Please advise interviewee that they need to answer all questions truthfully to allow for best care, and any misrepresentations of information may affect their ability to be seen at this clinic   => Was this discussed? Yes     If Minor Child (under age 18)    Who is/are the legal guardian(s) of the child?     Is there a custody agreement? No     If \"YES\"- Custody orders must be obtained prior to scheduling the first appointment  In addition, Consent to Treatment must be signed by all legal guardians prior to scheduling the first appointment    If \"NO\"- Consent to Treatment must be signed by all legal guardians prior to scheduling the first appointment    Behavioral Health Outpatient Intake History -     Presenting Problem (in patient's own words):       Pt stated that he has anxiety     Are there any communication barriers for this patient?     No                                               If yes, please describe barriers:  NONE   If there is a unique situation, please refer to Jefry Manley/Evi Hutchison for final determination.    Are you taking any psychiatric medications? No     If \"YES\" -What are they  NONE       If \"YES\" -Who prescribes?     Has the Patient previously received outpatient Talk Therapy or Medication Management from [facility]  No        If \"YES\"- When, Where and with Whom?         If \"NO\" -Has Patient received these services elsewhere?       If \"YES\" -When, Where, and with Whom?    Has the Patient abused alcohol or other substances in the last 6 months ? No  No concerns of substance abuse are reported.     If \"YES\" -What substance, How much, How often?     If illegal substance: Refer to [facility] (for [facility]) or [facility].   If Alcohol in excess of 10 drinks per week:  Refer to [facility] (for [facility]) or [facility]    Legal History-     Is this treatment court ordered? No   If \"yes \"send to :  Talk Therapy " ": Send to Jefry Hutchison for final determination   Med Management: Send to Dr Gamboa for final determination     Has the Patient been convicted of a felony?  No   If \"Yes\" send to -When, What?  Talk Therapy: Send to Jefry Hutchison for final determination   Med Management: Send to Dr Gamboa for final determination     ACCEPTED as a patient Yes  If \"Yes\" Appointment Date: 2/11/2025    Referred Elsewhere? No  If “Yes” - (Where? Ex: [facility], [facility], [facility], [facility], etc.)       Name of Insurance Co:[employer]  Insurance ID#[id number]  Insurance Phone #  If ins is primary or secondary?Primary   If patient is a minor, parents information such as Name, D.O.B of guarantor.  "

## 2024-11-25 ENCOUNTER — TELEPHONE (OUTPATIENT)
Dept: PSYCHIATRY | Facility: CLINIC | Age: 24
End: 2024-11-25

## 2024-11-25 NOTE — TELEPHONE ENCOUNTER
Forms sent via [employer].    M requesting that forms be completed via [employer] prior to appt.

## 2025-01-17 ENCOUNTER — TELEPHONE (OUTPATIENT)
Dept: NEUROLOGY | Facility: CLINIC | Age: 25
End: 2025-01-17

## 2025-01-20 DIAGNOSIS — G25.0 TREMOR, ESSENTIAL: ICD-10-CM

## 2025-01-21 RX ORDER — PROPRANOLOL HCL 20 MG
20 TABLET ORAL EVERY 12 HOURS SCHEDULED
Qty: 60 TABLET | Refills: 5 | Status: SHIPPED | OUTPATIENT
Start: 2025-01-21

## 2025-01-31 NOTE — TELEPHONE ENCOUNTER
LOV 5/7/24 Guy Owens   - Return in about 6 months (around 11/7/2024) for Recheck.    I called pt and pt is now scheduled for next available 5/27/25 at 2:30 pm with Guy Owens.  Added to wait list for sooner appt.

## 2025-02-27 ENCOUNTER — TELEPHONE (OUTPATIENT)
Age: 25
End: 2025-02-27

## 2025-02-27 NOTE — TELEPHONE ENCOUNTER
Appointment canceled for Romeo AIMEE Torres ([id number])  Visit type: OFFICE VISIT SHORT [facility]  3/4/2025 2:30 PM (30 minutes) with Guy Jordan DO in [facility]     Reason for cancellation: Canceled via MyChart     Patient comments: Changing insurance will reschedule      Called pt to reschedule out, pt stated he will call back once he knows he has insurance.